# Patient Record
Sex: MALE | Race: WHITE | NOT HISPANIC OR LATINO | Employment: OTHER | URBAN - METROPOLITAN AREA
[De-identification: names, ages, dates, MRNs, and addresses within clinical notes are randomized per-mention and may not be internally consistent; named-entity substitution may affect disease eponyms.]

---

## 2024-06-21 ENCOUNTER — HOSPITAL ENCOUNTER (EMERGENCY)
Facility: HOSPITAL | Age: 39
Discharge: HOME | End: 2024-06-21
Attending: EMERGENCY MEDICINE
Payer: MEDICAID

## 2024-06-21 VITALS
HEIGHT: 70 IN | WEIGHT: 129.44 LBS | TEMPERATURE: 98 F | OXYGEN SATURATION: 97 % | BODY MASS INDEX: 18.53 KG/M2 | RESPIRATION RATE: 20 BRPM | DIASTOLIC BLOOD PRESSURE: 78 MMHG | SYSTOLIC BLOOD PRESSURE: 129 MMHG | HEART RATE: 67 BPM

## 2024-06-21 DIAGNOSIS — H16.133 PHOTOKERATITIS OF BOTH EYES: Primary | ICD-10-CM

## 2024-06-21 PROCEDURE — 99283 EMERGENCY DEPT VISIT LOW MDM: CPT | Mod: ER

## 2024-06-21 PROCEDURE — 25000003 PHARM REV CODE 250: Mod: ER | Performed by: EMERGENCY MEDICINE

## 2024-06-21 RX ORDER — HYDROCODONE BITARTRATE AND ACETAMINOPHEN 5; 325 MG/1; MG/1
1 TABLET ORAL
Status: COMPLETED | OUTPATIENT
Start: 2024-06-21 | End: 2024-06-21

## 2024-06-21 RX ORDER — IBUPROFEN 600 MG/1
600 TABLET ORAL EVERY 6 HOURS PRN
Qty: 10 TABLET | Refills: 0 | Status: SHIPPED | OUTPATIENT
Start: 2024-06-21

## 2024-06-21 RX ORDER — ERYTHROMYCIN 5 MG/G
OINTMENT OPHTHALMIC
Status: COMPLETED | OUTPATIENT
Start: 2024-06-21 | End: 2024-06-21

## 2024-06-21 RX ORDER — ERYTHROMYCIN 5 MG/G
OINTMENT OPHTHALMIC
Qty: 3.5 G | Refills: 0 | Status: SHIPPED | OUTPATIENT
Start: 2024-06-21

## 2024-06-21 RX ORDER — TETRACAINE HYDROCHLORIDE 5 MG/ML
2 SOLUTION OPHTHALMIC
Status: COMPLETED | OUTPATIENT
Start: 2024-06-21 | End: 2024-06-21

## 2024-06-21 RX ADMIN — HYDROCODONE BITARTRATE AND ACETAMINOPHEN 1 TABLET: 5; 325 TABLET ORAL at 01:06

## 2024-06-21 RX ADMIN — ERYTHROMYCIN: 5 OINTMENT OPHTHALMIC at 02:06

## 2024-06-21 RX ADMIN — FLUORESCEIN SODIUM 1 EACH: 1 STRIP OPHTHALMIC at 01:06

## 2024-06-21 RX ADMIN — TETRACAINE HYDROCHLORIDE 2 DROP: 5 SOLUTION OPHTHALMIC at 01:06

## 2024-06-21 NOTE — ED NOTES
Patient examined, evaluated, and educated on discharge prescriptions and instructions by Dr Elda MCKENZIE. Patient discharged to [facility] by Dr Elda MCKENZIE.

## 2024-06-21 NOTE — ED PROVIDER NOTES
Encounter Date: 6/21/2024       History     Chief Complaint   Patient presents with    Eye Pain     Patient states that he believes he has a welding flash in both eyes but the right worse than the left      Patient is a 30-year-old male who presents to the emergency department with complaints of bilateral eye pain.  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  Eye pain has gradually been worsening.  It woke him from his sleep today.  He reports multiple prior incidents.      Review of patient's allergies indicates:  No Known Allergies  Past Medical History:   Diagnosis Date    ADD (attention deficit disorder)     Diabetes insipidus     Low testosterone      Past Surgical History:   Procedure Laterality Date    BACK SURGERY       No family history on file.  Social History     Tobacco Use    Smoking status: Every Day     Current packs/day: 0.75     Types: Cigarettes   Substance Use Topics    Alcohol use: No    Drug use: No     Review of Systems   Eyes:  Positive for pain and redness.   All other systems reviewed and are negative.      Physical Exam     Initial Vitals [06/21/24 0042]   BP Pulse Resp Temp SpO2   129/78 67 19 97.7 °F (36.5 °C) 97 %      MAP       --         Physical Exam    Nursing note and vitals reviewed.  Constitutional: He appears well-developed and well-nourished. No distress.   HENT:   Head: Normocephalic and atraumatic.   Mouth/Throat: Oropharynx is clear and moist.   Eyes:   Photophobia.  Sitting with his eyes covered.  Conjunctival erythema bilaterally.  No foreign body.  Normal fluorescein exam   Neck: Neck supple. No tracheal deviation present.   Cardiovascular:  Normal rate.           Pulmonary/Chest: No respiratory distress.   Musculoskeletal:         General: No edema. Normal range of motion.      Cervical back: Neck supple.     Neurological: He is alert and oriented to person, place, and time.   No focal deficits   Skin: Skin is warm. No rash noted. No  erythema.   Psychiatric: He has a normal mood and affect. His behavior is normal.         ED Course   Procedures  Labs Reviewed - No data to display       Imaging Results    None          Medications   HYDROcodone-acetaminophen 5-325 mg per tablet 1 tablet (1 tablet Oral Given 6/21/24 0150)   TETRAcaine HCl (PF) 0.5 % Drop 2 drop (2 drops Both Eyes Given 6/21/24 0150)   fluorescein ophthalmic strip 1 each (1 each Both Eyes Given 6/21/24 0150)   erythromycin 5 mg/gram (0.5 %) ophthalmic ointment ( Both Eyes Given 6/21/24 0221)     Medical Decision Making  Photo keratitis.  Pain improved with p.o. hydrocodone and topical tetracaine.  Patient declined wanting anything stronger than an ibuprofen prescription to go home with for pain.  Erythromycin ointment provided.  Advised outpatient ophthalmology follow-up    Amount and/or Complexity of Data Reviewed  External Data Reviewed: notes.     Details: Past medical history, medications, allergy sections reviewed    Risk  OTC drugs.  Prescription drug management.                                      Clinical Impression:  Final diagnoses:  [H16.133] Photokeratitis of both eyes (Primary)          ED Disposition Condition    Discharge Stable          ED Prescriptions       Medication Sig Dispense Start Date End Date Auth. Provider    ibuprofen (ADVIL,MOTRIN) 600 MG tablet Take 1 tablet (600 mg total) by mouth every 6 (six) hours as needed for Pain. 10 tablet 6/21/2024 -- Hong Schroeder MD    erythromycin (ROMYCIN) ophthalmic ointment Place a 1/2 inch ribbon of ointment into the lower eyelid of each eye every 6 hours for 3 days 3.5 g 6/21/2024 -- Hong Schroeder MD          Follow-up Information       Follow up With Specialties Details Why Contact Info    Follow up with an ophthalmologist  Schedule an appointment as soon as possible for a visit       [facility] Emergency Medicine  As needed, If symptoms worsen [id number] [address]  [facility]  [id number]  [phone]             Hong Schroeder MD  06/21/24 0423